# Patient Record
Sex: FEMALE
[De-identification: names, ages, dates, MRNs, and addresses within clinical notes are randomized per-mention and may not be internally consistent; named-entity substitution may affect disease eponyms.]

---

## 2022-04-27 ENCOUNTER — APPOINTMENT (OUTPATIENT)
Dept: OBGYN | Facility: CLINIC | Age: 57
End: 2022-04-27
Payer: MEDICAID

## 2022-04-27 VITALS
TEMPERATURE: 98.1 F | RESPIRATION RATE: 12 BRPM | HEIGHT: 64 IN | OXYGEN SATURATION: 97 % | HEART RATE: 80 BPM | WEIGHT: 141 LBS | DIASTOLIC BLOOD PRESSURE: 85 MMHG | SYSTOLIC BLOOD PRESSURE: 167 MMHG | BODY MASS INDEX: 24.07 KG/M2

## 2022-04-27 DIAGNOSIS — Z85.3 PERSONAL HISTORY OF MALIGNANT NEOPLASM OF BREAST: ICD-10-CM

## 2022-04-27 DIAGNOSIS — M51.37 OTHER INTERVERTEBRAL DISC DEGENERATION, LUMBOSACRAL REGION: ICD-10-CM

## 2022-04-27 DIAGNOSIS — Z87.891 PERSONAL HISTORY OF NICOTINE DEPENDENCE: ICD-10-CM

## 2022-04-27 DIAGNOSIS — Z78.9 OTHER SPECIFIED HEALTH STATUS: ICD-10-CM

## 2022-04-27 DIAGNOSIS — I10 ESSENTIAL (PRIMARY) HYPERTENSION: ICD-10-CM

## 2022-04-27 DIAGNOSIS — Z01.419 ENCOUNTER FOR GYNECOLOGICAL EXAMINATION (GENERAL) (ROUTINE) W/OUT ABNORMAL FINDINGS: ICD-10-CM

## 2022-04-27 DIAGNOSIS — Z82.49 FAMILY HISTORY OF ISCHEMIC HEART DISEASE AND OTHER DISEASES OF THE CIRCULATORY SYSTEM: ICD-10-CM

## 2022-04-27 DIAGNOSIS — M54.50 LOW BACK PAIN, UNSPECIFIED: ICD-10-CM

## 2022-04-27 DIAGNOSIS — E78.00 PURE HYPERCHOLESTEROLEMIA, UNSPECIFIED: ICD-10-CM

## 2022-04-27 PROBLEM — Z00.00 ENCOUNTER FOR PREVENTIVE HEALTH EXAMINATION: Status: ACTIVE | Noted: 2022-04-27

## 2022-04-27 PROCEDURE — 99386 PREV VISIT NEW AGE 40-64: CPT

## 2022-04-27 RX ORDER — ATORVASTATIN CALCIUM 80 MG/1
TABLET, FILM COATED ORAL
Refills: 0 | Status: ACTIVE | COMMUNITY

## 2022-04-27 RX ORDER — LISINOPRIL 30 MG/1
TABLET ORAL
Refills: 0 | Status: ACTIVE | COMMUNITY

## 2022-04-27 NOTE — DISCUSSION/SUMMARY
[FreeTextEntry1] : [] pap/hpv\par [] breast ca management and screening per oncologist/pcp\par [] colonoscopy UTD\par [] other HME by pcp\par RTC in 1yr or sooner prn\par Carey FELICIANO

## 2022-04-27 NOTE — HISTORY OF PRESENT ILLNESS
[FreeTextEntry1] : 55yo P0 LMP 2014 here for annual gyn exam.\par pap-2yrs ago, wnl\par mammo- scheduled for 6/2022. hx of breast ca s/p right lumpectomy 2020, RT and currently on year 2/5 of HT. managed by oncologist who is also her pcp\par colonoscopy- yesterday, wnl\par \par

## 2022-04-28 LAB — HPV HIGH+LOW RISK DNA PNL CVX: NOT DETECTED

## 2022-05-02 LAB — CYTOLOGY CVX/VAG DOC THIN PREP: NORMAL

## 2023-09-25 ENCOUNTER — EMERGENCY (EMERGENCY)
Facility: HOSPITAL | Age: 58
LOS: 1 days | Discharge: HOME | End: 2023-09-25
Attending: EMERGENCY MEDICINE
Payer: COMMERCIAL

## 2023-09-25 VITALS
OXYGEN SATURATION: 98 % | HEART RATE: 70 BPM | TEMPERATURE: 99 F | RESPIRATION RATE: 18 BRPM | DIASTOLIC BLOOD PRESSURE: 82 MMHG | SYSTOLIC BLOOD PRESSURE: 167 MMHG

## 2023-09-25 VITALS
TEMPERATURE: 98 F | HEIGHT: 64 IN | DIASTOLIC BLOOD PRESSURE: 93 MMHG | HEART RATE: 66 BPM | SYSTOLIC BLOOD PRESSURE: 174 MMHG | RESPIRATION RATE: 16 BRPM | WEIGHT: 138.01 LBS | OXYGEN SATURATION: 98 %

## 2023-09-25 PROCEDURE — 71110 X-RAY EXAM RIBS BIL 3 VIEWS: CPT

## 2023-09-25 PROCEDURE — 70450 CT HEAD/BRAIN W/O DYE: CPT | Mod: MA

## 2023-09-25 PROCEDURE — 99284 EMERGENCY DEPT VISIT MOD MDM: CPT | Mod: 25

## 2023-09-25 PROCEDURE — 71045 X-RAY EXAM CHEST 1 VIEW: CPT

## 2023-09-25 PROCEDURE — 99284 EMERGENCY DEPT VISIT MOD MDM: CPT

## 2023-09-25 PROCEDURE — 73562 X-RAY EXAM OF KNEE 3: CPT | Mod: 26,LT

## 2023-09-25 PROCEDURE — 71045 X-RAY EXAM CHEST 1 VIEW: CPT | Mod: 26,59

## 2023-09-25 PROCEDURE — 71110 X-RAY EXAM RIBS BIL 3 VIEWS: CPT | Mod: 26

## 2023-09-25 PROCEDURE — 73562 X-RAY EXAM OF KNEE 3: CPT

## 2023-09-25 PROCEDURE — 70450 CT HEAD/BRAIN W/O DYE: CPT | Mod: 26,MA

## 2023-09-25 RX ORDER — IBUPROFEN 200 MG
600 TABLET ORAL ONCE
Refills: 0 | Status: COMPLETED | OUTPATIENT
Start: 2023-09-25 | End: 2023-09-25

## 2023-09-25 RX ORDER — ACETAMINOPHEN 500 MG
975 TABLET ORAL ONCE
Refills: 0 | Status: COMPLETED | OUTPATIENT
Start: 2023-09-25 | End: 2023-09-25

## 2023-09-25 RX ORDER — LIDOCAINE 4 G/100G
1 CREAM TOPICAL ONCE
Refills: 0 | Status: COMPLETED | OUTPATIENT
Start: 2023-09-25 | End: 2023-09-25

## 2023-09-25 RX ADMIN — Medication 975 MILLIGRAM(S): at 16:17

## 2023-09-25 RX ADMIN — Medication 600 MILLIGRAM(S): at 16:17

## 2023-09-25 RX ADMIN — LIDOCAINE 1 PATCH: 4 CREAM TOPICAL at 16:17

## 2023-09-25 NOTE — ED PROVIDER NOTE - DIFFERENTIAL DIAGNOSIS
Ddx includes, however, is not limited to: msk pain/strain, head injury, neck injury, other Differential Diagnosis

## 2023-09-25 NOTE — ED PROVIDER NOTE - PROGRESS NOTE DETAILS
Sheron Ovalle MD PGY3: CT and XR reviewed. Patient ambulating well, pain improved. Will discharge. Patient eating a sandwich, family member is here with her to drive her home.
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
## 2023-09-25 NOTE — ED ADULT NURSE REASSESSMENT NOTE - NS ED NURSE REASSESS COMMENT FT1
Report received from JULIA Brian. Pt A&Ox4, in no acute distress at this time. Patient safety and comfort measures maintained.

## 2023-09-25 NOTE — ED PROVIDER NOTE - PHYSICAL EXAMINATION
Left and right upper back tenderness. No midline tenderness, deformities or step offs. Moving all extremities. Tenderness to palpation left knee, full ROM. Full ROM neck, no midline neck tenderness. No obvious trauma to the head, no tenderness to palpation, bleeding, or swelling. No FND. Abdomen soft, non-tender. PERRL, EOMI. Small abrasion to left hand, full ROM BUE.
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
## 2023-09-25 NOTE — ED PROVIDER NOTE - PATIENT PORTAL LINK FT
You can access the FollowMyHealth Patient Portal offered by [facility] by registering at the following website: [email or web address]. By joining [employer]’s FollowMyHealth portal, you will also be able to view your health information using other applications (apps) compatible with our system.
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
## 2025-06-06 ENCOUNTER — APPOINTMENT (OUTPATIENT)
Dept: OBGYN | Facility: CLINIC | Age: 60
End: 2025-06-06
Payer: MEDICARE

## 2025-06-06 VITALS — DIASTOLIC BLOOD PRESSURE: 83 MMHG | SYSTOLIC BLOOD PRESSURE: 144 MMHG

## 2025-06-06 VITALS
WEIGHT: 144 LBS | OXYGEN SATURATION: 96 % | HEART RATE: 74 BPM | TEMPERATURE: 98 F | SYSTOLIC BLOOD PRESSURE: 162 MMHG | BODY MASS INDEX: 24.72 KG/M2 | DIASTOLIC BLOOD PRESSURE: 89 MMHG | RESPIRATION RATE: 16 BRPM

## 2025-06-06 PROCEDURE — 99203 OFFICE O/P NEW LOW 30 MIN: CPT

## 2025-06-12 NOTE — HISTORY OF PRESENT ILLNESS
[Patient reported mammogram was normal] : Patient reported mammogram was normal [Patient reported colonoscopy was normal] : Patient reported colonoscopy was normal [FreeTextEntry1] : CC: here for second opinion LMP:   60yo postmenopausal female here for consultation. Would like second opinion of pap smear results. She does not have her results with her, she emailed record release to [facility] office but per our , their office says their EMR systems are down. Will have her email use record release, and we will fax over to their office on Monday to obtain results.  States she went to [facility] 2024, was told +HPV. States she is uncertain this was a pap smear but it was after a pelvic exam. Does not know what cytology showed, states was told this is an STI and was told to follow-up in 1 year.  OBHx:  abortions x2  GynHx: menarche age 15yo, [id number], last 4-5 days, no issue w periods pap smear: unsure  not sure about STDs    partners/sex:  No sexually active currently PMH: HTN, high cholesterol, malignant neoplasm of breast PSH:  right  breast lumpectomy 2020    oophorectomy unilateral unsure which side   FmHx:   father brain cancer  dx:80s         paternal side stomach cancer  dx 70s Social: denies E/D  smoker -1pack a week  Hx of anxiety/depression  since  Allergies: NKDA Meds: "estrogen meds" for 5years then stopped, depression medication, pain killer meds, HTN medication, stomach meds  [Mammogramdate] : 5/2025 [ColonoscopyDate] : 2022 [PapSmeardate] : likely 5/2025

## 2025-06-12 NOTE — HISTORY OF PRESENT ILLNESS
[Patient reported mammogram was normal] : Patient reported mammogram was normal [Patient reported colonoscopy was normal] : Patient reported colonoscopy was normal [FreeTextEntry1] : CC: here for second opinion LMP:   60yo postmenopausal female here for consultation. Would like second opinion of pap smear results. She does not have her results with her, she emailed record release to [facility] office but per our , their office says their EMR systems are down. Will have her email use record release, and we will fax over to their office on Monday to obtain results.  States she went to [facility] 2024, was told +HPV. States she is uncertain this was a pap smear but it was after a pelvic exam. Does not know what cytology showed, states was told this is an STI and was told to follow-up in 1 year.  OBHx:  abortions x2  GynHx: menarche age 15yo, [id number], last 4-5 days, no issue w periods pap smear: unsure  not sure about STDs    partners/sex:  No sexually active currently PMH: HTN, high cholesterol, malignant neoplasm of breast PSH:  right  breast lumpectomy 2020    oophorectomy unilateral unsure which side   FmHx:   father brain cancer  dx:80s         paternal side stomach cancer  dx 70s Social: denies E/D  smoker -1pack a week  Hx of anxiety/depression  since  Allergies: NKDA Meds: "estrogen meds" for 5years then stopped, depression medication, pain killer meds, HTN medication, stomach meds  [Mammogramdate] : 5/2025 [ColonoscopyDate] : 2022 [PapSmeardate] : likely 5/2025

## 2025-06-12 NOTE — PLAN
[FreeTextEntry1] : 60yo here for second opinion of pap results. +HPV We do not have records available to review. From what the patient reports, it sounds like NILM w +HRHPV but we will obtain records and confirm. We discussed the nature of HPV and that it is sexually transmitted. Patient states she has not had any form of sexual contact in 10 years, wonders if she got it from her . We have a record of pap smear from 2022 that was NILM w -HRHPV. Pt states did not regularly get pap smears before then. It is possible this was a false negative HPV result. We discussed vaccination, and that it only protects from future exposures, not curative once +HPV. We discussed the many strains of HPV and that some of them are linked to cervical cancer. We discussed that not everyone who is HPV positive will develop cervical cancer. Discussed the importance of screening and follow-up to prevent development of cervical cancer.  Will obtain records from prior office and call pt for follow-up plan.  ADDENDUM: Patients records reviewed. Pap NILM, +HPV. Recommend repeat pap in 1 year. Pt also has hx of left adnexal mass - upon review had sono at [facility] 6/2024 that showed 1.7 complex cystic lesion in left ovary, ORADS score 2. Had f/u pelvic MRI at [facility] 8/2024 that showed 10mm mildly complex left ovarian cyst, no ascites or LAD. ORADS 3. Will have patient to [facility] for repeat pelvic sono to f/u left ovarian lesion.

## 2025-06-12 NOTE — PLAN
[FreeTextEntry1] : 58yo here for second opinion of pap results. +HPV We do not have records available to review. From what the patient reports, it sounds like NILM w +HRHPV but we will obtain records and confirm. We discussed the nature of HPV and that it is sexually transmitted. Patient states she has not had any form of sexual contact in 10 years, wonders if she got it from her . We have a record of pap smear from 2022 that was NILM w -HRHPV. Pt states did not regularly get pap smears before then. It is possible this was a false negative HPV result. We discussed vaccination, and that it only protects from future exposures, not curative once +HPV. We discussed the many strains of HPV and that some of them are linked to cervical cancer. We discussed that not everyone who is HPV positive will develop cervical cancer. Discussed the importance of screening and follow-up to prevent development of cervical cancer.  Will obtain records from prior office and call pt for follow-up plan.  ADDENDUM: Patients records reviewed. Pap NILM, +HPV. Recommend repeat pap in 1 year. Pt also has hx of left adnexal mass - upon review had sono at [facility] 6/2024 that showed 1.7 complex cystic lesion in left ovary, ORADS score 2. Had f/u pelvic MRI at [facility] 8/2024 that showed 10mm mildly complex left ovarian cyst, no ascites or LAD. ORADS 3. Will have patient to [facility] for repeat pelvic sono to f/u left ovarian lesion.

## 2025-06-20 ENCOUNTER — APPOINTMENT (OUTPATIENT)
Dept: OBGYN | Facility: CLINIC | Age: 60
End: 2025-06-20

## 2025-06-20 ENCOUNTER — ASOB RESULT (OUTPATIENT)
Age: 60
End: 2025-06-20

## 2025-06-20 VITALS
RESPIRATION RATE: 16 BRPM | OXYGEN SATURATION: 95 % | TEMPERATURE: 97.9 F | DIASTOLIC BLOOD PRESSURE: 80 MMHG | SYSTOLIC BLOOD PRESSURE: 156 MMHG | HEART RATE: 86 BPM

## 2025-06-20 PROCEDURE — 76830 TRANSVAGINAL US NON-OB: CPT

## 2025-06-20 PROCEDURE — 76856 US EXAM PELVIC COMPLETE: CPT | Mod: 59

## 2025-06-25 NOTE — PROCEDURE
[Suspected Ovarian Cyst] : suspected ovarian cyst [Transvaginal Ultrasound] : transvaginal ultrasound